# Patient Record
Sex: MALE | Race: WHITE | Employment: FULL TIME | URBAN - METROPOLITAN AREA
[De-identification: names, ages, dates, MRNs, and addresses within clinical notes are randomized per-mention and may not be internally consistent; named-entity substitution may affect disease eponyms.]

---

## 2018-03-23 ENCOUNTER — HOSPITAL ENCOUNTER (OUTPATIENT)
Age: 63
Setting detail: THERAPIES SERIES
Discharge: HOME | End: 2018-03-23
Payer: COMMERCIAL

## 2018-04-02 ENCOUNTER — HOSPITAL ENCOUNTER (OUTPATIENT)
Age: 63
Setting detail: THERAPIES SERIES
Discharge: HOME | End: 2018-04-02

## 2018-04-02 PROCEDURE — 9900000038 HC CARDIAC REHAB PHASE 3 - MONTHLY

## 2018-04-04 ENCOUNTER — NURSE ONLY (OUTPATIENT)
Dept: CARDIOLOGY CLINIC | Age: 63
End: 2018-04-04

## 2018-04-04 VITALS
DIASTOLIC BLOOD PRESSURE: 60 MMHG | BODY MASS INDEX: 32.1 KG/M2 | HEART RATE: 82 BPM | HEIGHT: 72 IN | SYSTOLIC BLOOD PRESSURE: 126 MMHG | RESPIRATION RATE: 16 BRPM | WEIGHT: 237 LBS

## 2018-04-04 RX ORDER — GLIMEPIRIDE 2 MG/1
TABLET ORAL
Refills: 1 | COMMUNITY
Start: 2017-12-28

## 2018-04-13 ENCOUNTER — HOSPITAL ENCOUNTER (OUTPATIENT)
Age: 63
Discharge: HOME | End: 2018-04-15
Payer: COMMERCIAL

## 2018-04-13 ENCOUNTER — HOSPITAL ENCOUNTER (OUTPATIENT)
Dept: GENERAL RADIOLOGY | Age: 63
Discharge: HOME | End: 2018-04-15
Payer: COMMERCIAL

## 2018-04-13 DIAGNOSIS — M72.2 PLANTAR FASCIAL FIBROMATOSIS: ICD-10-CM

## 2018-04-13 PROCEDURE — 73630 X-RAY EXAM OF FOOT: CPT

## 2018-06-05 ENCOUNTER — OFFICE VISIT (OUTPATIENT)
Dept: CARDIOLOGY CLINIC | Age: 63
End: 2018-06-05
Payer: COMMERCIAL

## 2018-06-05 VITALS
HEART RATE: 76 BPM | WEIGHT: 232 LBS | HEIGHT: 72 IN | BODY MASS INDEX: 31.42 KG/M2 | SYSTOLIC BLOOD PRESSURE: 126 MMHG | RESPIRATION RATE: 16 BRPM | DIASTOLIC BLOOD PRESSURE: 68 MMHG

## 2018-06-05 DIAGNOSIS — I25.10 CORONARY ARTERY DISEASE INVOLVING NATIVE CORONARY ARTERY OF NATIVE HEART WITHOUT ANGINA PECTORIS: Primary | ICD-10-CM

## 2018-06-05 PROCEDURE — 99213 OFFICE O/P EST LOW 20 MIN: CPT | Performed by: INTERNAL MEDICINE

## 2018-06-05 PROCEDURE — 93000 ELECTROCARDIOGRAM COMPLETE: CPT | Performed by: INTERNAL MEDICINE

## 2018-06-05 RX ORDER — CHOLECALCIFEROL (VITAMIN D3) 1250 MCG
CAPSULE ORAL
COMMUNITY
End: 2020-07-07 | Stop reason: ALTCHOICE

## 2019-04-11 RX ORDER — LISINOPRIL 5 MG/1
TABLET ORAL
Qty: 30 TABLET | Refills: 10 | Status: SHIPPED | OUTPATIENT
Start: 2019-04-11 | End: 2019-04-12 | Stop reason: SDUPTHER

## 2019-04-11 RX ORDER — ATORVASTATIN CALCIUM 80 MG/1
TABLET, FILM COATED ORAL
Qty: 30 TABLET | Refills: 10 | Status: SHIPPED | OUTPATIENT
Start: 2019-04-11 | End: 2019-04-12 | Stop reason: SDUPTHER

## 2019-04-11 RX ORDER — METOPROLOL SUCCINATE 25 MG/1
TABLET, EXTENDED RELEASE ORAL
Qty: 30 TABLET | Refills: 10 | Status: SHIPPED | OUTPATIENT
Start: 2019-04-11 | End: 2019-04-12 | Stop reason: SDUPTHER

## 2019-04-12 RX ORDER — ATORVASTATIN CALCIUM 80 MG/1
TABLET, FILM COATED ORAL
Qty: 90 TABLET | Refills: 3 | OUTPATIENT
Start: 2019-04-12 | End: 2019-10-10 | Stop reason: SDUPTHER

## 2019-04-12 RX ORDER — METOPROLOL SUCCINATE 25 MG/1
TABLET, EXTENDED RELEASE ORAL
Qty: 90 TABLET | Refills: 3 | OUTPATIENT
Start: 2019-04-12 | End: 2019-10-10 | Stop reason: SDUPTHER

## 2019-04-12 RX ORDER — LISINOPRIL 5 MG/1
TABLET ORAL
Qty: 90 TABLET | Refills: 3 | OUTPATIENT
Start: 2019-04-12 | End: 2019-10-10 | Stop reason: SDUPTHER

## 2019-04-12 NOTE — TELEPHONE ENCOUNTER
DR Karen BREWSTER IS NOW GOING WITH A MAIL IN RX. I GAVE THEM A VERBAL ON THESE THREE RXS. PLEASE SIGN.   Jagdeep Zayas

## 2019-06-18 ENCOUNTER — OFFICE VISIT (OUTPATIENT)
Dept: CARDIOLOGY CLINIC | Age: 64
End: 2019-06-18
Payer: COMMERCIAL

## 2019-06-18 VITALS
DIASTOLIC BLOOD PRESSURE: 72 MMHG | BODY MASS INDEX: 31.23 KG/M2 | WEIGHT: 230.6 LBS | HEART RATE: 68 BPM | RESPIRATION RATE: 18 BRPM | SYSTOLIC BLOOD PRESSURE: 130 MMHG | HEIGHT: 72 IN

## 2019-06-18 DIAGNOSIS — R06.02 SOB (SHORTNESS OF BREATH) ON EXERTION: Primary | ICD-10-CM

## 2019-06-18 DIAGNOSIS — I25.10 CAD IN NATIVE ARTERY: ICD-10-CM

## 2019-06-18 PROCEDURE — 3017F COLORECTAL CA SCREEN DOC REV: CPT | Performed by: INTERNAL MEDICINE

## 2019-06-18 PROCEDURE — 93000 ELECTROCARDIOGRAM COMPLETE: CPT | Performed by: INTERNAL MEDICINE

## 2019-06-18 PROCEDURE — G8417 CALC BMI ABV UP PARAM F/U: HCPCS | Performed by: INTERNAL MEDICINE

## 2019-06-18 PROCEDURE — G8598 ASA/ANTIPLAT THER USED: HCPCS | Performed by: INTERNAL MEDICINE

## 2019-06-18 PROCEDURE — 99214 OFFICE O/P EST MOD 30 MIN: CPT | Performed by: INTERNAL MEDICINE

## 2019-06-18 PROCEDURE — 1036F TOBACCO NON-USER: CPT | Performed by: INTERNAL MEDICINE

## 2019-06-18 PROCEDURE — G8427 DOCREV CUR MEDS BY ELIG CLIN: HCPCS | Performed by: INTERNAL MEDICINE

## 2019-06-18 NOTE — PATIENT INSTRUCTIONS
Patient Education        Heart-Healthy Diet: Care Instructions  Your Care Instructions    A heart-healthy diet has lots of vegetables, fruits, nuts, beans, and whole grains, and is low in salt. It limits foods that are high in saturated fat, such as meats, cheeses, and fried foods. It may be hard to change your diet, but even small changes can lower your risk of heart attack and heart disease. Follow-up care is a key part of your treatment and safety. Be sure to make and go to all appointments, and call your doctor if you are having problems. It's also a good idea to know your test results and keep a list of the medicines you take. How can you care for yourself at home? Watch your portions  · Learn what a serving is. A \"serving\" and a \"portion\" are not always the same thing. Make sure that you are not eating larger portions than are recommended. For example, a serving of pasta is ½ cup. A serving size of meat is 2 to 3 ounces. A 3-ounce serving is about the size of a deck of cards. Measure serving sizes until you are good at [place]" them. Keep in mind that restaurants often serve portions that are 2 or 3 times the size of one serving. · To keep your energy level up and keep you from feeling hungry, eat often but in smaller portions. · Eat only the number of calories you need to stay at a healthy weight. If you need to lose weight, eat fewer calories than your body burns (through exercise and other physical activity). Eat more fruits and vegetables  · Eat a variety of fruit and vegetables every day. Dark green, deep orange, red, or yellow fruits and vegetables are especially good for you. Examples include spinach, carrots, peaches, and berries. · Keep carrots, celery, and other veggies handy for snacks. Buy fruit that is in season and store it where you can see it so that you will be tempted to eat it. · Cook dishes that have a lot of veggies in them, such as stir-fries and soups.   Limit saturated and trans fat  · Read food labels, and try to avoid saturated and trans fats. They increase your risk of heart disease. Trans fat is found in many processed foods such as cookies and crackers. · Use olive or canola oil when you cook. Try cholesterol-lowering spreads, such as Benecol or Take Control. · Bake, broil, grill, or steam foods instead of frying them. · Choose lean meats instead of high-fat meats such as hot dogs and sausages. Cut off all visible fat when you prepare meat. · Eat fish, skinless poultry, and meat alternatives such as soy products instead of high-fat meats. Soy products, such as tofu, may be especially good for your heart. · Choose low-fat or fat-free milk and dairy products. Eat fish  · Eat at least two servings of fish a week. Certain fish, such as salmon and tuna, contain omega-3 fatty acids, which may help reduce your risk of heart attack. Eat foods high in fiber  · Eat a variety of grain products every day. Include whole-grain foods that have lots of fiber and nutrients. Examples of whole-grain foods include oats, whole wheat bread, and brown rice. · Buy whole-grain breads and cereals, instead of white bread or pastries. Limit salt and sodium  · Limit how much salt and sodium you eat to help lower your blood pressure. · Taste food before you salt it. Add only a little salt when you think you need it. With time, your taste buds will adjust to less salt. · Eat fewer snack items, fast foods, and other high-salt, processed foods. Check food labels for the amount of sodium in packaged foods. · Choose low-sodium versions of canned goods (such as soups, vegetables, and beans). Limit sugar  · Limit drinks and foods with added sugar. These include candy, desserts, and soda pop. Limit alcohol  · Limit alcohol to no more than 2 drinks a day for men and 1 drink a day for women. Too much alcohol can cause health problems. When should you call for help?   Watch closely for changes in your

## 2019-06-19 NOTE — PROGRESS NOTES
[facility] Cardiology  MD Jose Rdz MD Olegario Manas, MD Raoul Anna. MD Chela Damon   1955  Naif Bonilla MD    Mr. Jose Eduardo Rivera was in the outpatient office on 6/18/2019 for follow up of his CAD. This 70-year-old gentleman has a history of diabetes, PCI to the RCA and normal LV function. He presented to our office for a yearly follow up visit. Over the past couple of months, he has noted dyspnea on exertion. Dyspnea occurs with severe exertion. For example, he noted that he played golf last week and when he was climbing up a small hill as part of the course, he did get short of breath. Normal activities, however, does not cause him to be short of breath. He denies orthopnea and PND. No lower extremity edema. Past medical history:   1. No known drug allergies. 2. 45 pack year smoking history, quit 05/04/2016.  3. [facility] presentation, 05/04/2016 with one hour of chest pain. EKG on presentation consistent with inferior STEMI. Lab work normal, except for BS of 456. 4. Emergent left heart cath, 05/04/2016, right groin approach. LMc long. Proximal LAD aneurysmal with mild-moderate disease. LCX large proximal OM1 normal. RCA sequential 80% lesions with thrombus. LVEF 45% with mild inferior hypokinesis. LVEDP 25 mmHg. 5. PCI to the RCA with 4.0 #38 XIENCE [place] VARUN, postdilated to 4.7 mm.   6. Newly diagnosed diabetes. BS on presentation 456. HgA1c 10.9.  7. Discharge BUN/Cr 11/0.8. H&H 14.8/43.9.  8. Echocardiogram, 08/04/2016.  EF 73%, stage 1 diastolic dysfunction, otherwise normal.       Review of Systems:  HEENT: negative for acute visual and auditory problems  Constitutional: negative for fever and chills  Respiratory: negative for cough and hemoptysis  Cardiovascular: as per HPI  Gastrointestinal: negative for abdominal pain, diarrhea, nausea and vomiting  Genitourinary: negative for dysuria and hematuria  Derm: negative for rash and skin lesion(s)  Neurological: negative for seizures and tremors  Endocrine: negative for diabetic symptoms including polydipsia and polyuria  Musculoskeletal: negative for CTD  Psychiatric: negative for anxiety and major depression    On exam, he is a middle-aged, obese gentleman in no particular distress. BP: 130/72. P: 68. R: 18. Wt. 230 lbs. BMI: 31. HEENT, conjunctiva pink. Sclerae anicteric. Mucous membranes are moist.  Neck, no JVD could be appreciated. Carotid upstrokes normal.  No bruits. Chest expands normally. No intercostal retractions. Cardiovascular exam reveals normal S1/S2. Regular rate and rhythm. No murmurs, rubs or gallops noted. Lungs have good air entry bilaterally without any creps, rhonchi or wheezes. Abdomen is soft, nontender with intact bowel sounds. Extremities have no edema. Distal pulses are normal bilaterally. No cyanosis. Skin has no rashes. Neurologically, oriented x 3. Psych, patient is pleasant. Back has no tenderness. Muscle tone is normal.       EKG, as per my interpretation, reveals sinus rhythm and is normal.      Mr. Darin Gonzalez was in the outpatient office for evaluation of dyspnea. The etiology is unclear. Clinically, he has no evidence of volume overload. No significant valvular heart disease was noted on auscultation. It could be related to his abdominal obesity and deconditioning. However, to look for any evidence of structural heart disease, an outpatient echocardiogram was scheduled. Differential diagnosis was discussed with the patient in detail. His blood pressure is well controlled. I therefore made no changes to his antihypertensive regimen.   His outpatient meds are as follows:   atorvastatin (LIPITOR) 80 MG tablet TAKE ONE TABLET BY MOUTH EVERY DAY IN THE EVENING   lisinopril (PRINIVIL;ZESTRIL) 5 MG tablet TAKE ONE TABLET BY MOUTH DAILY   metoprolol succinate (TOPROL XL) 25 MG extended release tablet TAKE ONE TABLET BY MOUTH DAILY   ASPIRIN LOW DOSE 81 MG chewable tablet CHEW AND SWALLOW ONE TABLET BY MOUTH EVERY DAY   empagliflozin (JARDIANCE) 10 MG tablet Take 25 mg by mouth daily    Cholecalciferol (VITAMIN D3) 14949 units CAPS Take by mouth Twice a Week    glimepiride (AMARYL) 2 MG tablet TAKE ONE TABLET BY MOUTH IN THE MORNING   sitaGLIPtin (JANUVIA) 100 MG tablet Take 1 tablet by mouth daily   insulin glargine (LANTUS SOLOSTAR) 100 UNIT/ML injection pen Inject 20 Units into the skin nightly     Of note, his presenting complaint during his acute coronary syndrome was chest pain. He does not have any episodes of chest pain, even with exertion. Therefore, I elected not to proceed with the stress perfusion study presently. If the dyspnea does progress and the echo is completely normal, exercise MPS study may be the next appropriate step. This was reviewed with him in detail. Should he remain stable, and no more testing is needed, he will be seen back in our office in a year. Thank you for allowing us to participate in the care of this patient.      RPV/tlr

## 2019-10-10 RX ORDER — ASPIRIN 81 MG/1
81 TABLET, CHEWABLE ORAL DAILY
Qty: 30 TABLET | Refills: 9 | Status: SHIPPED | OUTPATIENT
Start: 2019-10-10

## 2019-10-10 RX ORDER — LISINOPRIL 5 MG/1
TABLET ORAL
Qty: 90 TABLET | Refills: 3 | Status: SHIPPED | OUTPATIENT
Start: 2019-10-10 | End: 2020-11-19

## 2019-10-10 RX ORDER — METOPROLOL SUCCINATE 25 MG/1
TABLET, EXTENDED RELEASE ORAL
Qty: 90 TABLET | Refills: 3 | Status: SHIPPED | OUTPATIENT
Start: 2019-10-10 | End: 2020-11-19

## 2019-10-10 RX ORDER — ATORVASTATIN CALCIUM 80 MG/1
TABLET, FILM COATED ORAL
Qty: 90 TABLET | Refills: 3 | Status: SHIPPED | OUTPATIENT
Start: 2019-10-10 | End: 2020-11-19

## 2020-07-07 ENCOUNTER — OFFICE VISIT (OUTPATIENT)
Dept: CARDIOLOGY CLINIC | Age: 65
End: 2020-07-07
Payer: COMMERCIAL

## 2020-07-07 VITALS
WEIGHT: 231 LBS | HEIGHT: 72 IN | SYSTOLIC BLOOD PRESSURE: 126 MMHG | RESPIRATION RATE: 16 BRPM | BODY MASS INDEX: 31.29 KG/M2 | DIASTOLIC BLOOD PRESSURE: 60 MMHG | HEART RATE: 72 BPM

## 2020-07-07 PROCEDURE — 1036F TOBACCO NON-USER: CPT | Performed by: INTERNAL MEDICINE

## 2020-07-07 PROCEDURE — 93000 ELECTROCARDIOGRAM COMPLETE: CPT | Performed by: INTERNAL MEDICINE

## 2020-07-07 PROCEDURE — G8427 DOCREV CUR MEDS BY ELIG CLIN: HCPCS | Performed by: INTERNAL MEDICINE

## 2020-07-07 PROCEDURE — 3017F COLORECTAL CA SCREEN DOC REV: CPT | Performed by: INTERNAL MEDICINE

## 2020-07-07 PROCEDURE — 99213 OFFICE O/P EST LOW 20 MIN: CPT | Performed by: INTERNAL MEDICINE

## 2020-07-07 PROCEDURE — G8417 CALC BMI ABV UP PARAM F/U: HCPCS | Performed by: INTERNAL MEDICINE

## 2020-07-07 SDOH — HEALTH STABILITY: MENTAL HEALTH: HOW MANY STANDARD DRINKS CONTAINING ALCOHOL DO YOU HAVE ON A TYPICAL DAY?: 1 OR 2

## 2020-07-07 SDOH — HEALTH STABILITY: MENTAL HEALTH: HOW OFTEN DO YOU HAVE A DRINK CONTAINING ALCOHOL?: 4 OR MORE TIMES A WEEK

## 2020-07-07 ASSESSMENT — ENCOUNTER SYMPTOMS
CONSTIPATION: 0
COUGH: 0
SHORTNESS OF BREATH: 0
WHEEZING: 0
BLOOD IN STOOL: 0
DIARRHEA: 0
BACK PAIN: 0
NAUSEA: 0
ABDOMINAL PAIN: 0
VOMITING: 0

## 2020-07-07 NOTE — PROGRESS NOTES
OUTPATIENT CARDIOLOGY FOLLOW-UP    HPI:    Name: Bassam Ribera    Age: 63 y.o. Primary Care Physician: Angeles Mahan MD    Date of Service: 7/7/2020    Chief Complaint:   Chief Complaint   Patient presents with    Coronary Artery Disease     13 month ov.  (former RV). Denies ED/Hosp admits.  Diabetes        History of present illness : 24-year-old ex-smoker obese male who comes today for one-year follow-up visit. He was seen by Dr. Mana Murray on 6/19/2019. 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 He is fairly active. He has been walking approximately a mile a day since the months. He  denies recent chest pain, SOB, palpitations, d, syncope, PND, lower extremity edema or orthopnea. He feels lightheaded 1 hour after taking his heart medications. He feels dyspnea on heavy exertion. EKG done today revealed sinus rhythm at 70 bpm, left axis deviation and diffuse low voltage. .    Review of Systems:   Review of Systems   Constitutional: Negative for chills, fatigue and fever. HENT: Negative for nosebleeds. Respiratory: Negative for cough, shortness of breath and wheezing. He snores at night and admits to gasp for air if sleeping on his back. Gastrointestinal: Negative for abdominal pain, blood in stool, constipation, diarrhea, nausea and vomiting. Genitourinary: Negative for dysuria and hematuria. Musculoskeletal: Negative for back pain, joint swelling and myalgias. Neurological: Positive for light-headedness. Negative for syncope. Psychiatric/Behavioral: The patient is not nervous/anxious.            Past Medical History:  Past Medical History:   Diagnosis Date    CAD (coronary artery disease)     DM [facility]di[facility] mellitus) ([facility] [address]     Hyperlipidemia     Presence of drug coated stent in right coronary artery 12/6/2016 Past Surgical History:  Past Surgical History:   Procedure Laterality Date    CORONARY ANGIOPLASTY WITH STENT PLACEMENT  2016    Dr. Mikala Suero 4.0x38       Family History:  Family History   Problem Relation Age of Onset    Heart Disease Father        Social History:  Social History     Socioeconomic History    Marital status:      Spouse name: Not on file    Number of children: Not on file    Years of education: Not on file    Highest education level: Not on file   Occupational History    Not on file   Social Needs    Financial resource strain: Not on file    Food insecurity     Worry: Not on file     Inability: Not on file    Transportation needs     Medical: Not on file     Non-medical: Not on file   Tobacco Use    Smoking status: Former Smoker     Packs/day: 1.00     Years: 45.00     Pack years: 45.00     Types: Cigarettes     Last attempt to quit: 2016     Years since quittin.1    Smokeless tobacco: Never Used   Substance and Sexual Activity    Alcohol use: Yes     Comment:  2 beers daily     Drug use: No    Sexual activity: Not on file   Lifestyle    Physical activity     Days per week: Not on file     Minutes per session: Not on file    Stress: Not on file   Relationships    Social connections     Talks on phone: Not on file     Gets together: Not on file     Attends Sikh service: Not on file     Active member of club or organization: Not on file     Attends meetings of clubs or organizations: Not on file     Relationship status: Not on file    Intimate partner violence     Fear of current or ex partner: Not on file     Emotionally abused: Not on file     Physically abused: Not on file     Forced sexual activity: Not on file   Other Topics Concern    Not on file   Social History Narrative    2-3 cups coffee/day       Allergies:  No Known Allergies    Current Medications:  Current Outpatient Medications   Medication Sig Dispense Refill    aspirin (ASPIRIN LOW DOSE) 81 MG chewable tablet Take 1 tablet by mouth daily 30 tablet 9    atorvastatin (LIPITOR) 80 MG tablet TAKE ONE TABLET BY MOUTH EVERY DAY IN THE EVENING 90 tablet 3    metoprolol succinate (TOPROL XL) 25 MG extended release tablet TAKE ONE TABLET BY MOUTH DAILY 90 tablet 3    lisinopril (PRINIVIL;ZESTRIL) 5 MG tablet TAKE ONE TABLET BY MOUTH DAILY 90 tablet 3    empagliflozin (JARDIANCE) 10 MG tablet Take 25 mg by mouth daily       Cholecalciferol (VITAMIN D3) 47658 units CAPS Take by mouth Twice a Week       glimepiride (AMARYL) 2 MG tablet TAKE ONE TABLET BY MOUTH IN THE MORNING  1    sitaGLIPtin (JANUVIA) 100 MG tablet Take 1 tablet by mouth daily 30 tablet 0    insulin glargine (LANTUS SOLOSTAR) 100 UNIT/ML injection pen Inject 20 Units into the skin nightly (Patient taking differently: Inject 26 Units into the skin nightly ) 5 Pen 0     No current facility-administered medications for this visit. Physical Exam:  Ht 6' (1.829 m)   BMI 31.27 kg/m²   Wt Readings from Last 3 Encounters:   06/18/19 230 lb 9.6 oz (104.6 kg)   06/05/18 232 lb (105.2 kg)   04/04/18 237 lb (107.5 kg)       Physical Exam  Constitutional:       General: He is not in acute distress. Appearance: He is well-developed. He is obese. HENT:      Head: Normocephalic and atraumatic. Neck:      Musculoskeletal: Neck supple. Vascular: No carotid bruit or JVD. Cardiovascular:      Rate and Rhythm: Normal rate and regular rhythm. Heart sounds: No murmur. No friction rub. No gallop. Pulmonary:      Breath sounds: Normal breath sounds. No wheezing or rales. Chest:      Chest wall: No tenderness. Abdominal:      General: Bowel sounds are normal. There is no distension. Palpations: Abdomen is soft. There is no mass. Tenderness: There is no abdominal tenderness. Comments: No abdominal bruit. Musculoskeletal:      Right lower leg: No edema. Left lower leg: No edema.    Skin: General: Skin is warm and dry. Neurological:      Mental Status: He is alert and oriented to person, place, and time. Laboratory Tests:  Lab Results   Component Value Date    CREATININE 0.8 05/06/2016    BUN 11 05/06/2016     05/06/2016    K 3.6 05/06/2016     05/06/2016    CO2 20 (L) 05/06/2016     Lab Results   Component Value Date    MG 1.8 05/06/2016     Lab Results   Component Value Date    WBC 9.3 05/05/2016    HGB 14.8 05/05/2016    HCT 43.9 05/05/2016    MCV 97.8 05/05/2016     05/05/2016     Lab Results   Component Value Date    ALT 22 05/05/2016    AST 23 05/05/2016    ALKPHOS 103 05/05/2016    BILITOT 0.7 05/05/2016     Lab Results   Component Value Date    TROPONINI 0.13 (H) 05/05/2016    TROPONINI 0.03 05/04/2016    TROPONINI <0.01 05/04/2016     Lab Results   Component Value Date    INR 1.0 05/04/2016    PROTIME 10.9 05/04/2016     Lab Results   Component Value Date    TSH 1.110 05/05/2016     Lab Results   Component Value Date    LABA1C 10.9 (H) 05/05/2016     No results found for: EAG  Lab Results   Component Value Date    CHOL 209 (H) 05/05/2016     Lab Results   Component Value Date    TRIG 251 (H) 05/05/2016     Lab Results   Component Value Date    HDL 24 05/05/2016     Lab Results   Component Value Date    LDLCALC 135 (H) 05/05/2016     Lab Results   Component Value Date    LABVLDL 50 05/05/2016       Cardiac Tests:  Echocardiogram: Done on 8/4/2016 revealed mild mitral and tricuspid regurgitation, grade 1 diastolic dysfunction with an ejection fraction of 65%. ASSESSMENT / PLAN:  -CAD: Stable with no angina. -Grade 1 diastolic dysfunction: Clinically compensated. -Mild mitral and tricuspid regurgitation.  -Diabetes. -Hyperlipidemia: On Lipitor.  -Obesity. We will continue current cardiac medications. We will arrange for the patient to have an echocardiogram to follow-up his mitral and tricuspid regurgitation.   Patient was advised to lose weight, eat healthy diet and continue exercising. Patient was advised to maintain social distancing. We will follow-up at the office in 1 year. The patient's current medication list, allergies, problem list and results of all previously ordered testing were reviewed at today's visit. {  Kim Leach MD , [address] [facility].   [facility]) Cardiology

## 2020-07-27 ENCOUNTER — TELEPHONE (OUTPATIENT)
Dept: CARDIOLOGY | Age: 65
End: 2020-07-27

## 2020-07-27 NOTE — TELEPHONE ENCOUNTER
Confirmed echo and reviewed pre procedure COVID check list.  Electronically signed by Jasmine Telles on 7/27/2020 at 2:41 PM'

## 2020-08-17 ENCOUNTER — TELEPHONE (OUTPATIENT)
Dept: CARDIOLOGY | Age: 65
End: 2020-08-17

## 2020-08-19 ENCOUNTER — HOSPITAL ENCOUNTER (OUTPATIENT)
Dept: CARDIOLOGY | Age: 65
Discharge: HOME | End: 2020-08-19
Payer: COMMERCIAL

## 2020-08-19 LAB
LV EF: 68 %
LVEF MODALITY: NORMAL

## 2020-08-19 PROCEDURE — 93306 TTE W/DOPPLER COMPLETE: CPT

## 2020-11-19 RX ORDER — ATORVASTATIN CALCIUM 80 MG/1
TABLET, FILM COATED ORAL
Qty: 90 TABLET | Refills: 3 | Status: SHIPPED
Start: 2020-11-19 | End: 2021-04-06 | Stop reason: SDUPTHER

## 2020-11-19 RX ORDER — METOPROLOL SUCCINATE 25 MG/1
TABLET, EXTENDED RELEASE ORAL
Qty: 90 TABLET | Refills: 3 | Status: SHIPPED
Start: 2020-11-19 | End: 2021-04-06 | Stop reason: SDUPTHER

## 2020-11-19 RX ORDER — LISINOPRIL 5 MG/1
TABLET ORAL
Qty: 90 TABLET | Refills: 3 | Status: SHIPPED
Start: 2020-11-19 | End: 2021-04-06 | Stop reason: SDUPTHER

## 2021-04-06 RX ORDER — LISINOPRIL 5 MG/1
5 TABLET ORAL DAILY
Qty: 90 TABLET | Refills: 3 | Status: SHIPPED
Start: 2021-04-06 | End: 2022-04-11

## 2021-04-06 RX ORDER — ATORVASTATIN CALCIUM 80 MG/1
80 TABLET, FILM COATED ORAL DAILY
Qty: 90 TABLET | Refills: 3 | Status: SHIPPED
Start: 2021-04-06 | End: 2022-04-11

## 2021-04-06 RX ORDER — METOPROLOL SUCCINATE 25 MG/1
25 TABLET, EXTENDED RELEASE ORAL DAILY
Qty: 90 TABLET | Refills: 3 | Status: SHIPPED
Start: 2021-04-06 | End: 2022-04-11

## 2021-08-03 ENCOUNTER — OFFICE VISIT (OUTPATIENT)
Dept: CARDIOLOGY CLINIC | Age: 66
End: 2021-08-03
Payer: COMMERCIAL

## 2021-08-03 VITALS
DIASTOLIC BLOOD PRESSURE: 60 MMHG | BODY MASS INDEX: 30.77 KG/M2 | SYSTOLIC BLOOD PRESSURE: 128 MMHG | RESPIRATION RATE: 20 BRPM | HEIGHT: 72 IN | HEART RATE: 71 BPM | WEIGHT: 227.2 LBS

## 2021-08-03 DIAGNOSIS — I25.10 CORONARY ARTERY DISEASE WITHOUT ANGINA PECTORIS, UNSPECIFIED VESSEL OR LESION TYPE, UNSPECIFIED WHETHER NATIVE OR TRANSPLANTED HEART: Primary | ICD-10-CM

## 2021-08-03 PROCEDURE — 99213 OFFICE O/P EST LOW 20 MIN: CPT | Performed by: INTERNAL MEDICINE

## 2021-08-03 PROCEDURE — G8427 DOCREV CUR MEDS BY ELIG CLIN: HCPCS | Performed by: INTERNAL MEDICINE

## 2021-08-03 PROCEDURE — G8417 CALC BMI ABV UP PARAM F/U: HCPCS | Performed by: INTERNAL MEDICINE

## 2021-08-03 PROCEDURE — 3017F COLORECTAL CA SCREEN DOC REV: CPT | Performed by: INTERNAL MEDICINE

## 2021-08-03 PROCEDURE — 93000 ELECTROCARDIOGRAM COMPLETE: CPT | Performed by: INTERNAL MEDICINE

## 2021-08-03 PROCEDURE — 1036F TOBACCO NON-USER: CPT | Performed by: INTERNAL MEDICINE

## 2021-08-03 ASSESSMENT — ENCOUNTER SYMPTOMS
DIARRHEA: 0
SHORTNESS OF BREATH: 0
VOMITING: 0
BACK PAIN: 0
COUGH: 0
CONSTIPATION: 0
WHEEZING: 0
NAUSEA: 0
ABDOMINAL PAIN: 0
BLOOD IN STOOL: 0

## 2021-08-03 NOTE — PROGRESS NOTES
OUTPATIENT CARDIOLOGY FOLLOW-UP    HPI:    Name: Harpal Crowell    Age: 61 y.o. Primary Care Physician: Donovan Melton MD    Date of Service: 8/3/2021    Chief Complaint:   Chief Complaint   Patient presents with    Coronary Artery Disease     annual        History of present illness :    79-year-old ex-smoker obese male who comes today for one-year follow-up visit. He was seen in my office on 7/7/2020. 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 Patient has not been active regularly. However, he sometimes walks up to a mile a day and is still playing golf. He denies chest discomfort or dyspnea on exertion. He denies palpitations, dizziness or syncope. He denies orthopnea, PND but admits to get minimal edema of the left ankle. EKG done today revealed sinus rhythm at 71 bpm, low voltage in frontal leads and left axis deviation. Review of Systems:   Review of Systems   Constitutional: Negative for chills, fatigue and fever. HENT: Negative for nosebleeds. Respiratory: Negative for cough, shortness of breath and wheezing. He snores at night. Gastrointestinal: Negative for abdominal pain, blood in stool, constipation, diarrhea, nausea and vomiting. Occasional GERD. Genitourinary: Negative for dysuria and hematuria. Musculoskeletal: Negative for back pain, joint swelling and myalgias. Neurological: Negative for syncope and light-headedness. Psychiatric/Behavioral: The patient is not nervous/anxious.            Past Medical History:  Past Medical History:   Diagnosis Date    CAD (coronary artery disease)     DM (diabetes mellitus) ([facility] [address]     Hyperlipidemia     Presence of drug coated stent in right coronary artery 12/6/2016       Past Surgical History:  Past Surgical History:   Procedure Laterality Date    CORONARY ANGIOPLASTY WITH STENT PLACEMENT  2016    Dr. Aris Priest Ozzie [id number]    KNEE ARTHROSCOPY Right 1997       Family History:  Family History   Problem Relation Age of Onset    Heart Disease Father        Social History:  Social History     Socioeconomic History    Marital status:      Spouse name: Not on file    Number of children: Not on file    Years of education: Not on file    Highest education level: Not on file   Occupational History    Not on file   Tobacco Use    Smoking status: Former Smoker     Packs/day: 1.00     Years: 45.00     Pack years: 45.00     Types: Cigarettes     Quit date: 2016     Years since quittin.2    Smokeless tobacco: Never Used   Vaping Use    Vaping Use: Never used   Substance and Sexual Activity    Alcohol use: Yes     Alcohol/week: 14.0 standard drinks     Types: 14 Cans of beer per week    Drug use: Never    Sexual activity: Not on file   Other Topics Concern    Not on file   Social History Narrative    Drinks 2-3 cups coffee/day     Social Determinants of Health     Financial Resource Strain:     Difficulty of Paying Living Expenses:    Food Insecurity:     Worried About Running Out of Food in the Last Year:     Ran Out of Food in the Last Year:    Transportation Needs:     Lack of Transportation (Medical):  Lack of Transportation (Non-Medical):    Physical Activity:     Days of Exercise per Week:     Minutes of Exercise per Session:    Stress:     Feeling of Stress :    Social Connections:     Frequency of Communication with Friends and Family:     Frequency of Social Gatherings with Friends and Family:     Attends Shinto Services:     Active Member of Clubs or Organizations:     Attends Club or Organization Meetings:     Marital Status:    Intimate Partner Violence:     Fear of Current or Ex-Partner:     Emotionally Abused:     Physically Abused:     Sexually Abused:         Allergies:  No Known Allergies    Current Medications:  Current Outpatient Medications   Medication Sig Dispense Refill    metoprolol succinate (TOPROL XL) 25 MG extended release tablet Take 1 tablet by mouth daily 90 tablet 3    atorvastatin (LIPITOR) 80 MG tablet Take 1 tablet by mouth daily 90 tablet 3    lisinopril (PRINIVIL;ZESTRIL) 5 MG tablet Take 1 tablet by mouth daily 90 tablet 3    aspirin (ASPIRIN LOW DOSE) 81 MG chewable tablet Take 1 tablet by mouth daily 30 tablet 9    empagliflozin (JARDIANCE) 10 MG tablet Take 25 mg by mouth daily       glimepiride (AMARYL) 2 MG tablet TAKE ONE TABLET BY MOUTH IN THE MORNING  1    sitaGLIPtin (JANUVIA) 100 MG tablet Take 1 tablet by mouth daily 30 tablet 0    insulin glargine (LANTUS SOLOSTAR) 100 UNIT/ML injection pen Inject 20 Units into the skin nightly (Patient taking differently: Inject 26 Units into the skin nightly ) 5 Pen 0     No current facility-administered medications for this visit. Physical Exam:  Ht 6' (1.829 m)   BMI 31.33 kg/m²   Wt Readings from Last 3 Encounters:   07/07/20 231 lb (104.8 kg)   06/18/19 230 lb 9.6 oz (104.6 kg)   06/05/18 232 lb (105.2 kg)       Physical Exam  Constitutional:       General: He is not in acute distress. Appearance: He is well-developed. He is obese. HENT:      Head: Normocephalic and atraumatic. Neck:      Vascular: No carotid bruit or JVD. Cardiovascular:      Rate and Rhythm: Normal rate and regular rhythm. Heart sounds: No murmur heard. No friction rub. No gallop. Pulmonary:      Breath sounds: Normal breath sounds. No wheezing or rales. Chest:      Chest wall: No tenderness. Abdominal:      General: Bowel sounds are normal. There is no distension. Palpations: Abdomen is soft. There is no mass. Tenderness: There is no abdominal tenderness. Comments: No abdominal bruit. Musculoskeletal:      Cervical back: Neck supple. Left lower leg: Edema present. Comments: Trivial left leg edema. Skin:     General: Skin is warm and dry. Neurological:      Mental Status: He is alert and oriented to person, place, and time. Laboratory Tests:  Lab Results   Component Value Date    CREATININE 0.8 05/06/2016    BUN 11 05/06/2016     05/06/2016    K 3.6 05/06/2016     05/06/2016    CO2 20 (L) 05/06/2016     Lab Results   Component Value Date    MG 1.8 05/06/2016     Lab Results   Component Value Date    WBC 9.3 05/05/2016    HGB 14.8 05/05/2016    HCT 43.9 05/05/2016    MCV 97.8 05/05/2016     05/05/2016     Lab Results   Component Value Date    ALT 22 05/05/2016    AST 23 05/05/2016    ALKPHOS 103 05/05/2016    BILITOT 0.7 05/05/2016     Lab Results   Component Value Date    TROPONINI 0.13 (H) 05/05/2016    TROPONINI 0.03 05/04/2016    TROPONINI <0.01 05/04/2016     Lab Results   Component Value Date    INR 1.0 05/04/2016    PROTIME 10.9 05/04/2016     Lab Results   Component Value Date    TSH 1.110 05/05/2016     Lab Results   Component Value Date    LABA1C 10.9 (H) 05/05/2016       Lab Results   Component Value Date    CHOL 209 (H) 05/05/2016     Lab Results   Component Value Date    TRIG 251 (H) 05/05/2016     Lab Results   Component Value Date    HDL 24 05/05/2016     Lab Results   Component Value Date    LDLCALC 135 (H) 05/05/2016     Lab Results   Component Value Date    LABVLDL 50 05/05/2016       Cardiac Tests:  Echocardiogram done on 8/19/2020:  Left Ventricle   Normal left ventricle size. Normal left ventricle wall thickness. No wall motion abnormalities. Normal diastolic function. EF 65-70%. Right Ventricle   Normal right ventricular size and function. Tapse 2.3 cm. Left Atrium   Left atrium is of normal size. Right Atrium   Normal right atrium size. Mitral Valve   Normal mitral valve structure and function. Physiologic and/or trace mitral regurgitation is present. No mitral valve stenosis present.       Tricuspid Valve   The tricuspid valve was not well visualized. Physiologic and/or trace tricuspid regurgitation. RVSP is 30 mmHg. Aortic Valve   The aortic valve is trileaflet. No hemodynamically significant aortic stenosis is present. No evidence of aortic valve regurgitation. Pulmonic Valve   The pulmonic valve was not well visualized. Pericardial Effusion   No evidence of pericardial effusion. Aorta   Aortic root dimension within normal limits. Miscellaneous   Normal Inferior Vena Cava diameter. Conclusions      Summary   Technically adequate study. Echocardiogram within normal limits. ASSESSMENT / PLAN:  -CAD: Stable with no angina.  -Diabetes: Remains uncontrolled with hemoglobin A1c above 8.  -Hyperlipidemia: On high-dose Lipitor.  -Obesity. We will continue current cardiac medications. Patient was advised to lose weight and to exercise regularly. We will follow-up at the office in 1 year. The patient's current medication list, allergies, problem list and results of all previously ordered testing were reviewed at today's visit. {  Maco Mo MD , [facility].   [facility]) Cardiology

## 2022-04-11 RX ORDER — METOPROLOL SUCCINATE 25 MG/1
TABLET, EXTENDED RELEASE ORAL
Qty: 90 TABLET | Refills: 3 | Status: SHIPPED
Start: 2022-04-11 | End: 2022-07-05 | Stop reason: SDUPTHER

## 2022-04-11 RX ORDER — ATORVASTATIN CALCIUM 80 MG/1
TABLET, FILM COATED ORAL
Qty: 90 TABLET | Refills: 3 | Status: SHIPPED
Start: 2022-04-11 | End: 2022-07-05 | Stop reason: SDUPTHER

## 2022-04-11 RX ORDER — LISINOPRIL 5 MG/1
TABLET ORAL
Qty: 90 TABLET | Refills: 3 | Status: SHIPPED
Start: 2022-04-11 | End: 2022-07-05 | Stop reason: SDUPTHER

## 2022-07-05 RX ORDER — METOPROLOL SUCCINATE 25 MG/1
TABLET, EXTENDED RELEASE ORAL
Qty: 90 TABLET | Refills: 3 | Status: SHIPPED | OUTPATIENT
Start: 2022-07-05

## 2022-07-05 RX ORDER — ATORVASTATIN CALCIUM 80 MG/1
TABLET, FILM COATED ORAL
Qty: 90 TABLET | Refills: 3 | Status: SHIPPED | OUTPATIENT
Start: 2022-07-05

## 2022-07-05 RX ORDER — LISINOPRIL 5 MG/1
TABLET ORAL
Qty: 90 TABLET | Refills: 3 | Status: SHIPPED | OUTPATIENT
Start: 2022-07-05

## 2024-05-28 RX ORDER — ATORVASTATIN CALCIUM 80 MG/1
TABLET, FILM COATED ORAL
Qty: 90 TABLET | Refills: 0 | OUTPATIENT
Start: 2024-05-28

## 2024-05-28 RX ORDER — LISINOPRIL 5 MG/1
TABLET ORAL
Qty: 90 TABLET | Refills: 0 | OUTPATIENT
Start: 2024-05-28

## 2024-05-28 RX ORDER — METOPROLOL SUCCINATE 25 MG/1
TABLET, EXTENDED RELEASE ORAL
Qty: 90 TABLET | Refills: 0 | OUTPATIENT
Start: 2024-05-28

## 2024-06-17 RX ORDER — LISINOPRIL 5 MG/1
TABLET ORAL
Qty: 90 TABLET | Refills: 0 | OUTPATIENT
Start: 2024-06-17

## 2024-06-17 RX ORDER — ATORVASTATIN CALCIUM 80 MG/1
TABLET, FILM COATED ORAL
Qty: 90 TABLET | Refills: 0 | OUTPATIENT
Start: 2024-06-17

## 2024-06-17 RX ORDER — METOPROLOL SUCCINATE 25 MG/1
TABLET, EXTENDED RELEASE ORAL
Qty: 90 TABLET | Refills: 0 | OUTPATIENT
Start: 2024-06-17

## 2025-04-08 ENCOUNTER — HOSPITAL ENCOUNTER (OUTPATIENT)
Age: 70
Discharge: HOME | End: 2025-04-10

## 2025-04-15 LAB — SURGICAL PATHOLOGY REPORT: NORMAL

## 2025-04-28 ENCOUNTER — HOSPITAL ENCOUNTER (OUTPATIENT)
Dept: ULTRASOUND IMAGING | Age: 70
Discharge: HOME | End: 2025-04-30
Payer: MEDICARE

## 2025-04-28 DIAGNOSIS — I87.2 PERIPHERAL VENOUS INSUFFICIENCY: ICD-10-CM

## 2025-04-28 PROCEDURE — 93970 EXTREMITY STUDY: CPT
